# Patient Record
Sex: MALE | Race: WHITE | NOT HISPANIC OR LATINO
[De-identification: names, ages, dates, MRNs, and addresses within clinical notes are randomized per-mention and may not be internally consistent; named-entity substitution may affect disease eponyms.]

---

## 2021-04-13 ENCOUNTER — APPOINTMENT (OUTPATIENT)
Dept: OTOLARYNGOLOGY | Facility: CLINIC | Age: 61
End: 2021-04-13
Payer: COMMERCIAL

## 2021-04-13 DIAGNOSIS — R22.1 LOCALIZED SWELLING, MASS AND LUMP, NECK: ICD-10-CM

## 2021-04-13 DIAGNOSIS — Z87.891 PERSONAL HISTORY OF NICOTINE DEPENDENCE: ICD-10-CM

## 2021-04-13 PROCEDURE — 31575 DIAGNOSTIC LARYNGOSCOPY: CPT

## 2021-04-13 PROCEDURE — 99204 OFFICE O/P NEW MOD 45 MIN: CPT | Mod: 25

## 2021-04-13 PROCEDURE — 99072 ADDL SUPL MATRL&STAF TM PHE: CPT

## 2021-04-13 RX ORDER — THYROID, PORCINE 60 MG/1
60 TABLET ORAL
Refills: 0 | Status: ACTIVE | COMMUNITY

## 2021-04-13 NOTE — PROCEDURE
[Lesion] : lesion identified by mirror examination needing further evaluation [None] : none [Flexible Endoscope] : examined with the flexible endoscope [Serial Number: ___] : Serial Number: [unfilled] [de-identified] : No lesions in the NPx, HPx or larynx.  Exam of the OPx with exophytic well mucosalized mass involving the BOT obscuring the larynx.  Patient is intubatable via fiberoptic technique.  VC are mobile, airway patent.\par

## 2021-04-13 NOTE — CONSULT LETTER
[Dear  ___] : Dear  [unfilled], [Consult Letter:] : I had the pleasure of evaluating your patient, [unfilled]. [Please see my note below.] : Please see my note below. [Consult Closing:] : Thank you very much for allowing me to participate in the care of this patient.  If you have any questions, please do not hesitate to contact me. [Sincerely,] : Sincerely, [FreeTextEntry2] : Dr. Mariam Pugh\par [address], \[place] [address] [FreeTextEntry3] : Dev [DrRosanne  ___] : Dr. RUELAS

## 2021-04-13 NOTE — HISTORY OF PRESENT ILLNESS
[de-identified] : Patient with hx of lymphoma ( f/u Emil Delgado)  and  referred by Dr. Pugh for cervical lymphadenopathy.  pt went to see ENT  for sinus and dysphonia and exam showed mass at the BOT, pt has ct of neck done yesterday at [facility]- no office report.   Denies pain, sorethroat, dysphagia, odynophagia, dyspnea, dysphonia, nasal obstruction/bleeding, fever, weakness. \par pt intension wt loss about 20 -25lbs for the past 5 months with diet change. pt is going to see Dr. Delgado today.\par quit smoke for 29 yrs ago was 1/2 to 1ppd for 10 yrs\par social etoh

## 2021-04-13 NOTE — DATA REVIEWED
[de-identified] : CT Neck report pending.  However, this is soft tissue mass involving the BOT epicenter at the midline narrowing the OPx airway.

## 2021-04-13 NOTE — PHYSICAL EXAM
[Laryngoscopy Performed] : laryngoscopy was performed, see procedure section for findings [Normal] : no rashes [de-identified] : There are bilateral level II LN, approx. 2 cm, mobile, no TTP. [FreeTextEntry1] : No concerning lesions in the OC/OPx on inspection.  There is a firm midline BOT mass on palpation.\par

## 2021-04-19 ENCOUNTER — OUTPATIENT (OUTPATIENT)
Dept: OUTPATIENT SERVICES | Facility: HOSPITAL | Age: 61
LOS: 1 days | End: 2021-04-19
Payer: COMMERCIAL

## 2021-04-19 ENCOUNTER — APPOINTMENT (OUTPATIENT)
Dept: DISASTER EMERGENCY | Facility: CLINIC | Age: 61
End: 2021-04-19

## 2021-04-19 VITALS
HEIGHT: 72 IN | DIASTOLIC BLOOD PRESSURE: 84 MMHG | RESPIRATION RATE: 14 BRPM | HEART RATE: 87 BPM | OXYGEN SATURATION: 98 % | WEIGHT: 250 LBS | SYSTOLIC BLOOD PRESSURE: 146 MMHG | TEMPERATURE: 98 F

## 2021-04-19 DIAGNOSIS — J39.2 OTHER DISEASES OF PHARYNX: ICD-10-CM

## 2021-04-19 DIAGNOSIS — Z01.818 ENCOUNTER FOR OTHER PREPROCEDURAL EXAMINATION: ICD-10-CM

## 2021-04-19 DIAGNOSIS — Z96.649 PRESENCE OF UNSPECIFIED ARTIFICIAL HIP JOINT: Chronic | ICD-10-CM

## 2021-04-19 LAB
ANION GAP SERPL CALC-SCNC: 11 MMOL/L — SIGNIFICANT CHANGE UP (ref 7–14)
BUN SERPL-MCNC: 20 MG/DL — SIGNIFICANT CHANGE UP (ref 7–23)
CALCIUM SERPL-MCNC: 8.8 MG/DL — SIGNIFICANT CHANGE UP (ref 8.4–10.5)
CHLORIDE SERPL-SCNC: 103 MMOL/L — SIGNIFICANT CHANGE UP (ref 98–107)
CO2 SERPL-SCNC: 25 MMOL/L — SIGNIFICANT CHANGE UP (ref 22–31)
CREAT SERPL-MCNC: 0.83 MG/DL — SIGNIFICANT CHANGE UP (ref 0.5–1.3)
GLUCOSE SERPL-MCNC: 97 MG/DL — SIGNIFICANT CHANGE UP (ref 70–99)
HCT VFR BLD CALC: 50 % — SIGNIFICANT CHANGE UP (ref 39–50)
HGB BLD-MCNC: 16.6 G/DL — SIGNIFICANT CHANGE UP (ref 13–17)
MCHC RBC-ENTMCNC: 28.2 PG — SIGNIFICANT CHANGE UP (ref 27–34)
MCHC RBC-ENTMCNC: 33.2 GM/DL — SIGNIFICANT CHANGE UP (ref 32–36)
MCV RBC AUTO: 85 FL — SIGNIFICANT CHANGE UP (ref 80–100)
NRBC # BLD: 0 /100 WBCS — SIGNIFICANT CHANGE UP
NRBC # FLD: 0 K/UL — SIGNIFICANT CHANGE UP
PLATELET # BLD AUTO: 102 K/UL — LOW (ref 150–400)
POTASSIUM SERPL-MCNC: 3.9 MMOL/L — SIGNIFICANT CHANGE UP (ref 3.5–5.3)
POTASSIUM SERPL-SCNC: 3.9 MMOL/L — SIGNIFICANT CHANGE UP (ref 3.5–5.3)
RBC # BLD: 5.88 M/UL — HIGH (ref 4.2–5.8)
RBC # FLD: 14 % — SIGNIFICANT CHANGE UP (ref 10.3–14.5)
SODIUM SERPL-SCNC: 139 MMOL/L — SIGNIFICANT CHANGE UP (ref 135–145)
WBC # BLD: 5.32 K/UL — SIGNIFICANT CHANGE UP (ref 3.8–10.5)
WBC # FLD AUTO: 5.32 K/UL — SIGNIFICANT CHANGE UP (ref 3.8–10.5)

## 2021-04-19 PROCEDURE — 93010 ELECTROCARDIOGRAM REPORT: CPT

## 2021-04-19 RX ORDER — SODIUM CHLORIDE 9 MG/ML
1000 INJECTION, SOLUTION INTRAVENOUS
Refills: 0 | Status: DISCONTINUED | OUTPATIENT
Start: 2021-04-22 | End: 2021-05-06

## 2021-04-19 NOTE — H&P PST ADULT - HISTORY OF PRESENT ILLNESS
60y/o male scheduled for direct laryngoscopy with biopsy esophagoscopy on 4/22/2021.  Pt states, "c/o hoarseness and sinus congestion, ENT identified enlarged lymph nodes, cat scan shows mass on the back of the  tongue.  Denies throat pain, difficulty with swallowing solids."

## 2021-04-19 NOTE — H&P PST ADULT - NSICDXPROBLEM_GEN_ALL_CORE_FT
PROBLEM DIAGNOSES  Problem: Other diseases of pharynx  Assessment and Plan: Pt scheduled for direcyt laryngoscopy with biopsy esophagoscopy on 4/22/2021.  labs done results pending, ekg done.  Pt went for covid testing.  Preop teaching done, pt able to verbalize understanding.   abnormal ekg-  spoke [facility] surgical coordinator, Dr. Hou requesting cardiology eval., pt sent to Dr. Gabriel office   OR booking notified of ZAHEER precautions.

## 2021-04-19 NOTE — H&P PST ADULT - NEGATIVE GENERAL SYMPTOMS
no fever/no chills/no anorexia/no weight loss/no weight gain/no polyphagia/no polyuria/no polydipsia/no malaise/no fatigue

## 2021-04-19 NOTE — H&P PST ADULT - NEGATIVE ENMT SYMPTOMS
no hearing difficulty/no ear pain/no nasal discharge/no nasal obstruction/no abnormal taste sensation/no gum bleeding/no dry mouth/no throat pain

## 2021-04-19 NOTE — H&P PST ADULT - PRO INTERPRETER NEED 2
English Optimize pain control, consider pain and palliative consult   Monitor Qtc Optimize pain control, consider pain and palliative consult   Monitor Qtc  If not already done, please obtain LFTs and Depakote level

## 2021-04-20 ENCOUNTER — TRANSCRIPTION ENCOUNTER (OUTPATIENT)
Age: 61
End: 2021-04-20

## 2021-04-20 LAB — SARS-COV-2 N GENE NPH QL NAA+PROBE: NOT DETECTED

## 2021-04-21 ENCOUNTER — TRANSCRIPTION ENCOUNTER (OUTPATIENT)
Age: 61
End: 2021-04-21

## 2021-04-22 ENCOUNTER — OUTPATIENT (OUTPATIENT)
Dept: OUTPATIENT SERVICES | Facility: HOSPITAL | Age: 61
LOS: 1 days | End: 2021-04-22
Payer: COMMERCIAL

## 2021-04-22 ENCOUNTER — OUTPATIENT (OUTPATIENT)
Dept: OUTPATIENT SERVICES | Facility: HOSPITAL | Age: 61
LOS: 1 days | Discharge: HOME | End: 2021-04-22
Payer: COMMERCIAL

## 2021-04-22 ENCOUNTER — RESULT REVIEW (OUTPATIENT)
Age: 61
End: 2021-04-22

## 2021-04-22 ENCOUNTER — APPOINTMENT (OUTPATIENT)
Dept: OTOLARYNGOLOGY | Facility: HOSPITAL | Age: 61
End: 2021-04-22

## 2021-04-22 VITALS
HEIGHT: 72 IN | WEIGHT: 250 LBS | DIASTOLIC BLOOD PRESSURE: 84 MMHG | HEART RATE: 89 BPM | OXYGEN SATURATION: 95 % | RESPIRATION RATE: 18 BRPM | TEMPERATURE: 98 F | SYSTOLIC BLOOD PRESSURE: 134 MMHG

## 2021-04-22 VITALS
TEMPERATURE: 98 F | DIASTOLIC BLOOD PRESSURE: 90 MMHG | HEART RATE: 74 BPM | OXYGEN SATURATION: 94 % | SYSTOLIC BLOOD PRESSURE: 126 MMHG | RESPIRATION RATE: 17 BRPM

## 2021-04-22 DIAGNOSIS — J39.2 OTHER DISEASES OF PHARYNX: ICD-10-CM

## 2021-04-22 DIAGNOSIS — Z96.649 PRESENCE OF UNSPECIFIED ARTIFICIAL HIP JOINT: Chronic | ICD-10-CM

## 2021-04-22 PROCEDURE — 88264 CHROMOSOME ANALYSIS 20-25: CPT

## 2021-04-22 PROCEDURE — 88280 CHROMOSOME KARYOTYPE STUDY: CPT

## 2021-04-22 PROCEDURE — 88189 FLOWCYTOMETRY/READ 16 & >: CPT

## 2021-04-22 PROCEDURE — 88365 INSITU HYBRIDIZATION (FISH): CPT | Mod: 26,59

## 2021-04-22 PROCEDURE — 43191 ESOPHAGOSCOPY RIGID TRNSO DX: CPT | Mod: GC

## 2021-04-22 PROCEDURE — 88334 PATH CONSLTJ SURG CYTO XM EA: CPT | Mod: 26,59

## 2021-04-22 PROCEDURE — 31535 LARYNGOSCOPY W/BIOPSY: CPT | Mod: GC

## 2021-04-22 PROCEDURE — 88285 CHROMOSOME COUNT ADDITIONAL: CPT

## 2021-04-22 PROCEDURE — 88341 IMHCHEM/IMCYTCHM EA ADD ANTB: CPT | Mod: 26,59

## 2021-04-22 PROCEDURE — 88305 TISSUE EXAM BY PATHOLOGIST: CPT | Mod: 26

## 2021-04-22 PROCEDURE — 88342 IMHCHEM/IMCYTCHM 1ST ANTB: CPT | Mod: 26,59

## 2021-04-22 PROCEDURE — 88360 TUMOR IMMUNOHISTOCHEM/MANUAL: CPT | Mod: 26

## 2021-04-22 PROCEDURE — 88367 INSITU HYBRIDIZATION AUTO: CPT | Mod: 26

## 2021-04-22 PROCEDURE — 88331 PATH CONSLTJ SURG 1 BLK 1SPC: CPT | Mod: 26

## 2021-04-22 PROCEDURE — 88332 PATH CONSLTJ SURG EA ADD BLK: CPT | Mod: 26

## 2021-04-22 PROCEDURE — 88364 INSITU HYBRIDIZATION (FISH): CPT | Mod: 26

## 2021-04-22 PROCEDURE — 88237 TISSUE CULTURE BONE MARROW: CPT

## 2021-04-22 RX ORDER — ONDANSETRON 8 MG/1
4 TABLET, FILM COATED ORAL ONCE
Refills: 0 | Status: DISCONTINUED | OUTPATIENT
Start: 2021-04-22 | End: 2021-05-06

## 2021-04-22 RX ORDER — THYROID 120 MG
1 TABLET ORAL
Qty: 0 | Refills: 0 | DISCHARGE

## 2021-04-22 RX ORDER — ACETAMINOPHEN 500 MG
20.31 TABLET ORAL
Qty: 0 | Refills: 0 | DISCHARGE
Start: 2021-04-22

## 2021-04-22 RX ORDER — FENTANYL CITRATE 50 UG/ML
50 INJECTION INTRAVENOUS
Refills: 0 | Status: DISCONTINUED | OUTPATIENT
Start: 2021-04-22 | End: 2021-04-22

## 2021-04-22 RX ORDER — OXYCODONE AND ACETAMINOPHEN 5; 325 MG/1; MG/1
1 TABLET ORAL
Qty: 20 | Refills: 0
Start: 2021-04-22 | End: 2021-04-26

## 2021-04-22 RX ORDER — IBUPROFEN 200 MG
10 TABLET ORAL
Qty: 0 | Refills: 0 | DISCHARGE
Start: 2021-04-22

## 2021-04-22 RX ORDER — FENTANYL CITRATE 50 UG/ML
25 INJECTION INTRAVENOUS
Refills: 0 | Status: DISCONTINUED | OUTPATIENT
Start: 2021-04-22 | End: 2021-04-22

## 2021-04-22 RX ORDER — ACETAMINOPHEN 500 MG
650 TABLET ORAL EVERY 6 HOURS
Refills: 0 | Status: DISCONTINUED | OUTPATIENT
Start: 2021-04-22 | End: 2021-05-06

## 2021-04-22 RX ORDER — ONDANSETRON 8 MG/1
4 TABLET, FILM COATED ORAL EVERY 4 HOURS
Refills: 0 | Status: DISCONTINUED | OUTPATIENT
Start: 2021-04-22 | End: 2021-05-06

## 2021-04-22 RX ORDER — IBUPROFEN 200 MG
200 TABLET ORAL EVERY 6 HOURS
Refills: 0 | Status: DISCONTINUED | OUTPATIENT
Start: 2021-04-22 | End: 2021-05-06

## 2021-04-22 RX ADMIN — FENTANYL CITRATE 50 MICROGRAM(S): 50 INJECTION INTRAVENOUS at 13:35

## 2021-04-22 RX ADMIN — FENTANYL CITRATE 50 MICROGRAM(S): 50 INJECTION INTRAVENOUS at 14:21

## 2021-04-22 RX ADMIN — FENTANYL CITRATE 50 MICROGRAM(S): 50 INJECTION INTRAVENOUS at 13:50

## 2021-04-22 RX ADMIN — SODIUM CHLORIDE 30 MILLILITER(S): 9 INJECTION, SOLUTION INTRAVENOUS at 09:48

## 2021-04-22 NOTE — ASU DISCHARGE PLAN (ADULT/PEDIATRIC) - ASU DC SPECIAL INSTRUCTIONSFT
Activity: No heavy lifting or strenuous activity. Walk as tolerated. Physical therapy per routine. Diet as tolerated. Pain medications tylenol and motrin as needed for pain. Follow up 1 - 2 weeks after discharge. Call office for appointment.  Office: [phone].  Call office for fever >101.5 or redness or drainage from wound. Activity: No heavy lifting or strenuous activity. Walk as tolerated. Physical therapy per routine. Diet as tolerated. Pain medication: percocet prescribed for pain. Follow up 1 - 2 weeks after discharge. Call office for appointment.  Office: [phone].  Call office for fever >101.5.

## 2021-04-22 NOTE — ASU DISCHARGE PLAN (ADULT/PEDIATRIC) - CARE PROVIDER_API CALL
Ventura Hou)  Otolaryngology  [address] [place] [address]  Phone: [phone]  Fax: [phone]  Follow Up Time:

## 2021-04-23 DIAGNOSIS — C85.90 NON-HODGKIN LYMPHOMA, UNSPECIFIED, UNSPECIFIED SITE: ICD-10-CM

## 2021-04-23 PROBLEM — E66.9 OBESITY, UNSPECIFIED: Chronic | Status: ACTIVE | Noted: 2021-04-19

## 2021-04-23 PROBLEM — E03.9 HYPOTHYROIDISM, UNSPECIFIED: Chronic | Status: ACTIVE | Noted: 2021-04-19

## 2021-04-23 PROBLEM — J39.2 OTHER DISEASES OF PHARYNX: Chronic | Status: ACTIVE | Noted: 2021-04-19

## 2021-04-23 LAB — TM INTERPRETATION: SIGNIFICANT CHANGE UP

## 2021-04-30 LAB — HEMATOPATHOLOGY REPORT: SIGNIFICANT CHANGE UP

## 2021-05-03 ENCOUNTER — APPOINTMENT (OUTPATIENT)
Dept: OTOLARYNGOLOGY | Facility: CLINIC | Age: 61
End: 2021-05-03
Payer: COMMERCIAL

## 2021-05-03 DIAGNOSIS — Z00.00 ENCOUNTER FOR GENERAL ADULT MEDICAL EXAMINATION W/OUT ABNORMAL FINDINGS: ICD-10-CM

## 2021-05-03 PROCEDURE — 99072 ADDL SUPL MATRL&STAF TM PHE: CPT

## 2021-05-03 PROCEDURE — 99214 OFFICE O/P EST MOD 30 MIN: CPT | Mod: 25

## 2021-05-03 PROCEDURE — 31575 DIAGNOSTIC LARYNGOSCOPY: CPT

## 2021-05-03 NOTE — HISTORY OF PRESENT ILLNESS
[de-identified] : Patient with hx of lymphoma ( f/u Emil Delgado)  and  referred by Dr. Pguh for cervical lymphadenopathy.  pt went to see ENT  for sinus and dysphonia and exam showed mass at the BOT, Ct of neck 4/12/2021. pt has DL bx on 4/22/2021 which showed Lymphoma. pt has no seen  see Dr. Delgado yet after the BX. pt has mild pain when swallow, yawm and sneezing. Denies  sorethroat, dysphagia, odynophagia, dyspnea, dysphonia, nasal obstruction/bleeding, fever, weakness. \par pt intension wt loss about 20 -25lbs for the past 5 months with diet change.\par quit smoke for 29 yrs ago was 1/2 to 1ppd for 10 yrs\par social etoh

## 2021-05-03 NOTE — PHYSICAL EXAM
[Laryngoscopy Performed] : laryngoscopy was performed, see procedure section for findings [Normal] : no rashes [de-identified] : There are bilateral level II LN, approx. 2 cm, mobile, no TTP. [FreeTextEntry1] : No concerning lesions in the OC/OPx on inspection.  There is a firm midline BOT mass on palpation.\par

## 2021-05-03 NOTE — CONSULT LETTER
How Did The Hair Loss Occur?: gradual in onset [Dear  ___] : Dear  [unfilled], How Severe Is It?: moderate [Courtesy Letter:] : I had the pleasure of seeing your patient, [unfilled], in my office today. Is This A New Presentation, Or A Follow-Up?: Follow Up Central Centrifugal Cicatricial Alopecia [Please see my note below.] : Please see my note below. [Consult Closing:] : Thank you very much for allowing me to participate in the care of this patient.  If you have any questions, please do not hesitate to contact me. [Sincerely,] : Sincerely, [DrRosanne  ___] : Dr. RUELAS [FreeTextEntry2] : Dr. Mariam Pugh\par [address], \[place] [address] \par  [FreeTextEntry3] : Dev

## 2021-05-03 NOTE — PROCEDURE
[Lesion] : lesion identified by mirror examination needing further evaluation [None] : none [Flexible Endoscope] : examined with the flexible endoscope [Serial Number: ___] : Serial Number: [unfilled] [de-identified] : No lesions in the NPx, HPx or larynx. Exam of the OPx with site of debulking healing quite well with the OPx now widely patent and without obscuring the view of the larynx.  VC are mobile, airway patent.\par  [de-identified] : BOT recurrent lymphoma

## 2021-05-06 LAB — CHROM ANALY OVERALL INTERP SPEC-IMP: SIGNIFICANT CHANGE UP

## 2021-08-09 ENCOUNTER — APPOINTMENT (OUTPATIENT)
Dept: OTOLARYNGOLOGY | Facility: CLINIC | Age: 61
End: 2021-08-09
Payer: COMMERCIAL

## 2021-08-09 VITALS
OXYGEN SATURATION: 99 % | TEMPERATURE: 98 F | BODY MASS INDEX: 31.83 KG/M2 | WEIGHT: 235 LBS | RESPIRATION RATE: 17 BRPM | HEIGHT: 72 IN | DIASTOLIC BLOOD PRESSURE: 84 MMHG | HEART RATE: 80 BPM | SYSTOLIC BLOOD PRESSURE: 123 MMHG

## 2021-08-09 DIAGNOSIS — C85.90 NON-HODGKIN LYMPHOMA, UNSPECIFIED, UNSPECIFIED SITE: ICD-10-CM

## 2021-08-09 DIAGNOSIS — J39.2 OTHER DISEASES OF PHARYNX: ICD-10-CM

## 2021-08-09 PROCEDURE — 99214 OFFICE O/P EST MOD 30 MIN: CPT | Mod: 25

## 2021-08-09 PROCEDURE — 31575 DIAGNOSTIC LARYNGOSCOPY: CPT

## 2021-08-09 NOTE — PROCEDURE
[None] : none [Flexible Endoscope] : examined with the flexible endoscope [Serial Number: ___] : Serial Number: [unfilled] [de-identified] : No lesions in the NPx, HPx or larynx. Exam of the OPx with site of debulking healing quite well with the OPx now widely patent and without obscuring the view of the larynx. VC are mobile, airway patent. [de-identified] : BOT lymphoma

## 2021-08-09 NOTE — HISTORY OF PRESENT ILLNESS
[de-identified] : Patient with hx of lymphoma ( f/u Emil Delgado)  and  referred by Dr. Pugh for cervical lymphadenopathy.  pt went to see ENT  for sinus and dysphonia and exam showed mass at the BOT, Ct of neck 4/12/2021. pt had DL bx on 4/22/2021 which showed Lymphoma.  pt  went back to see Dr. Delgado and is on immunotherapy and chemo. pt is doing well, no sorethroat or swelling at the throat. Denies  sorethroat, dysphagia, odynophagia, dyspnea, dysphonia, nasal obstruction/bleeding, fever, weakness. \par wt now stable. \par quit smoke for 29 yrs ago was 1/2 to 1ppd for 10 yrs\par social etoh

## 2021-08-09 NOTE — PHYSICAL EXAM
[Laryngoscopy Performed] : laryngoscopy was performed, see procedure section for findings [Normal] : no rashes [de-identified] : There are bilateral level II LN, approx. 2 cm, mobile, no TTP. [FreeTextEntry1] : No concerning lesions in the OC/OPx on inspection.  There is a firm midline BOT mass on palpation.\par

## 2022-02-14 ENCOUNTER — APPOINTMENT (OUTPATIENT)
Dept: OTOLARYNGOLOGY | Facility: CLINIC | Age: 62
End: 2022-02-14

## 2023-06-20 NOTE — ASU DISCHARGE PLAN (ADULT/PEDIATRIC) - "IF YOU OR YOUR GUARDIAN/FAMILY IS A SMOKER, IT IS IMPORTANT FOR YOUR HEALTH TO STOP SMOKING. PLEASE BE AWARE THAT SECOND HAND SMOKE IS ALSO HARMFUL."
06/20/23 0931   Post-Acute Status   Post-Acute Authorization Other   Other Status No Post-Acute Service Needs   Discharge Delays None known at this time   Discharge Plan   Discharge Plan A Home     No post acute services at this time      Shelley Owen CD, MSW, LMSW, RSW   Case Management  [facility]  Email: [email or web address]     Statement Selected

## 2024-10-08 NOTE — H&P PST ADULT - ALLERGIC/IMMUNOLOGIC
negative pt had NBNB emesis. PAULINE Prado made aware. pt is A&OX4, VSS, NSR on CM. pending MD orders. Pt awake, alert, with appropriate behavior noted. Family member at bedside